# Patient Record
Sex: FEMALE | Race: WHITE | NOT HISPANIC OR LATINO | Employment: OTHER | URBAN - METROPOLITAN AREA
[De-identification: names, ages, dates, MRNs, and addresses within clinical notes are randomized per-mention and may not be internally consistent; named-entity substitution may affect disease eponyms.]

---

## 2015-03-23 LAB — HBA1C MFR BLD HPLC: 5.2 %

## 2015-09-29 LAB — HBA1C MFR BLD HPLC: 5.2 %

## 2016-04-05 LAB — HBA1C MFR BLD HPLC: 5.3 %

## 2016-10-10 LAB — HBA1C MFR BLD HPLC: 5.5 %

## 2017-04-19 LAB — HBA1C MFR BLD HPLC: 5 %

## 2017-10-24 LAB — HBA1C MFR BLD HPLC: 4.9 %

## 2018-06-30 LAB
ALBUMIN SERPL-MCNC: 4.1 G/DL (ref 3.6–5.1)
ALBUMIN/GLOB SERPL: 1.4 (CALC) (ref 1–2.5)
ALP SERPL-CCNC: 58 U/L (ref 33–130)
ALT SERPL-CCNC: 14 U/L (ref 6–29)
AST SERPL-CCNC: 15 U/L (ref 10–35)
BILIRUB SERPL-MCNC: 1 MG/DL (ref 0.2–1.2)
BUN SERPL-MCNC: 12 MG/DL (ref 7–25)
BUN/CREAT SERPL: ABNORMAL (CALC) (ref 6–22)
CALCIUM SERPL-MCNC: 9.2 MG/DL (ref 8.6–10.4)
CHLORIDE SERPL-SCNC: 110 MMOL/L (ref 98–110)
CHOLEST SERPL-MCNC: 157 MG/DL
CHOLEST/HDLC SERPL: 4.5 (CALC)
CO2 SERPL-SCNC: 25 MMOL/L (ref 20–31)
CREAT SERPL-MCNC: 0.79 MG/DL (ref 0.6–0.93)
FT4I SERPL CALC-MCNC: 2.8 (ref 1.4–3.8)
GLOBULIN SER CALC-MCNC: 2.9 G/DL (CALC) (ref 1.9–3.7)
GLUCOSE SERPL-MCNC: 101 MG/DL (ref 65–99)
HBA1C MFR BLD: 4.8 % OF TOTAL HGB
HDLC SERPL-MCNC: 35 MG/DL
LDLC SERPL CALC-MCNC: 89 MG/DL (CALC)
NONHDLC SERPL-MCNC: 122 MG/DL (CALC)
POTASSIUM SERPL-SCNC: 4.2 MMOL/L (ref 3.5–5.3)
PROT SERPL-MCNC: 7 G/DL (ref 6.1–8.1)
SL AMB EGFR AFRICAN AMERICAN: 85 ML/MIN/1.73M2
SL AMB EGFR NON AFRICAN AMERICAN: 74 ML/MIN/1.73M2
SODIUM SERPL-SCNC: 143 MMOL/L (ref 135–146)
T3RU NFR SERPL: 31 % (ref 22–35)
T4 SERPL-MCNC: 9.1 MCG/DL (ref 4.5–12)
TRIGL SERPL-MCNC: 248 MG/DL
TSH SERPL-ACNC: 0.81 MIU/L (ref 0.4–4.5)

## 2018-07-12 ENCOUNTER — OFFICE VISIT (OUTPATIENT)
Dept: ENDOCRINOLOGY | Facility: HOSPITAL | Age: 73
End: 2018-07-12
Payer: MEDICARE

## 2018-07-12 VITALS
DIASTOLIC BLOOD PRESSURE: 66 MMHG | SYSTOLIC BLOOD PRESSURE: 136 MMHG | HEIGHT: 66 IN | HEART RATE: 60 BPM | BODY MASS INDEX: 41.4 KG/M2 | WEIGHT: 257.6 LBS

## 2018-07-12 DIAGNOSIS — E74.89 OTHER SPECIFIED DISORDERS OF CARBOHYDRATE METABOLISM (HCC): ICD-10-CM

## 2018-07-12 DIAGNOSIS — E03.9 HYPOTHYROIDISM, UNSPECIFIED TYPE: Primary | ICD-10-CM

## 2018-07-12 DIAGNOSIS — E78.5 HYPERLIPIDEMIA, UNSPECIFIED HYPERLIPIDEMIA TYPE: ICD-10-CM

## 2018-07-12 DIAGNOSIS — I10 HYPERTENSION, UNSPECIFIED TYPE: ICD-10-CM

## 2018-07-12 DIAGNOSIS — Z87.898 HISTORY OF PREDIABETES: ICD-10-CM

## 2018-07-12 PROCEDURE — 99204 OFFICE O/P NEW MOD 45 MIN: CPT | Performed by: INTERNAL MEDICINE

## 2018-07-12 RX ORDER — APIXABAN 5 MG/1
5 TABLET, FILM COATED ORAL 2 TIMES DAILY
Refills: 11 | COMMUNITY
Start: 2018-06-22

## 2018-07-12 RX ORDER — LEVOTHYROXINE SODIUM 200 MCG
200 TABLET ORAL DAILY
COMMUNITY
Start: 2018-04-17 | End: 2018-07-12

## 2018-07-12 RX ORDER — NIFEDIPINE 60 MG/1
60 TABLET, EXTENDED RELEASE ORAL DAILY
Qty: 90 TABLET | Refills: 3 | Status: SHIPPED | OUTPATIENT
Start: 2018-07-12 | End: 2019-02-07 | Stop reason: SDUPTHER

## 2018-07-12 RX ORDER — LEVOTHYROXINE SODIUM 200 MCG
TABLET ORAL
Qty: 90 TABLET | Refills: 3 | Status: SHIPPED | OUTPATIENT
Start: 2018-07-12 | End: 2019-02-07 | Stop reason: SDUPTHER

## 2018-07-12 RX ORDER — NIFEDIPINE 60 MG/1
60 TABLET, EXTENDED RELEASE ORAL DAILY
COMMUNITY
Start: 2018-04-17 | End: 2018-07-12 | Stop reason: SDUPTHER

## 2018-07-12 RX ORDER — ATENOLOL 25 MG/1
25 TABLET ORAL 2 TIMES DAILY
COMMUNITY
Start: 2018-06-13 | End: 2018-07-12 | Stop reason: SDUPTHER

## 2018-07-12 RX ORDER — ATENOLOL 25 MG/1
25 TABLET ORAL 2 TIMES DAILY
Qty: 180 TABLET | Refills: 3 | Status: SHIPPED | OUTPATIENT
Start: 2018-07-12 | End: 2019-08-04 | Stop reason: SDUPTHER

## 2018-07-12 RX ORDER — MULTIVIT WITH MINERALS/LUTEIN
1000 TABLET ORAL
COMMUNITY

## 2018-07-12 NOTE — LETTER
July 12, 2018     Mary Bell MD  [address] [place] [address]    Patient: Joycelyn Nguyen   YOB: 1945   Date of Visit: 7/12/2018       Dear Dr Bibiana Lopez: Thank you for referring Alyssa Red to me for evaluation  Below are my notes for this consultation  If you have questions, please do not hesitate to call me  I look forward to following your patient along with you  Sincerely,        Clayton Alicia DO        CC: No Recipients  Clayton Alicia DO  7/12/2018 12:10 PM  Sign at close encounter  7/12/2018    Assessment/Plan      Diagnoses and all orders for this visit:    Hypothyroidism, unspecified type  -     SYNTHROID 200 MCG tablet; 1 tab daily BRAND NECESSARY  -     TSH, 3rd generation Lab Collect; Future  -     T4, free Lab Collect; Future    Hypertension, unspecified type  -     atenolol (TENORMIN) 25 mg tablet; Take 1 tablet (25 mg total) by mouth 2 (two) times a day  -     NIFEdipine (PROCARDIA XL) 60 mg 24 hr tablet; Take 1 tablet (60 mg total) by mouth daily    History of prediabetes  -     Comprehensive metabolic panel Lab Collect; Future  -     HEMOGLOBIN A1C W/ EAG ESTIMATION Lab Collect; Future    Hyperlipidemia, unspecified hyperlipidemia type  -     Comprehensive metabolic panel Lab Collect; Future  -     Lipid Panel with Direct LDL reflex Lab Collect; Future    Other specified disorders of carbohydrate metabolism ([facility] )   -     HEMOGLOBIN A1C W/ EAG ESTIMATION Lab Collect; Future    Other orders  -     ELIQUIS 5 MG; Take 5 mg by mouth 2 (two) times a day  -     Ascorbic Acid (VITAMIN C) 1000 MG tablet; Take 1,000 mg by mouth  -     aspirin 81 MG tablet; Take 81 mg by mouth  -     Discontinue: atenolol (TENORMIN) 25 mg tablet; Take 25 mg by mouth 2 (two) times a day  -     Discontinue: SYNTHROID 200 MCG tablet; Take 200 mcg by mouth daily  -     Multiple Vitamins-Minerals (MULTIVITAMIN ADULT PO);  Take 1 tablet by mouth  -     Discontinue: NIFEdipine (PROCARDIA XL) 60 mg 24 hr tablet; Take 60 mg by mouth daily  -     sertraline (ZOLOFT) 50 mg tablet; Take 50 mg by mouth daily  -     Misc Natural Products (TURMERIC CURCUMIN) CAPS; Take by mouth        Assessment/Plan:  1  Hypothyroidism:  Clinically and biochemically euthyroid on Synthroid brand 200 mcg daily  Continue this dose and check TSH and free T4 before next appointment  2  Hypertension:  Controlled on current regimen  CC:   Hypothyroidism    History of Present Illness     HPI: Tita Lo is a 74y o  year old female with history of hypothyroidism, anxiety, pulmonary embolism, hypertension who presents to [facility] care  Previous patient doctor Javed  She takes Synthroid brand 100 mcg daily for her hypothyroidism  Overall she feels well on this  Lately, she has had increased stress in her life due to being active in general as well as her  suffered a stroke in April and was treated at [facility]   He is doing better  She recently had her Zoloft dose increase to 50 mg daily in overall she think she is doing better  She denies any symptoms of hyperthyroidism  Review of Systems   Constitutional: Negative for chills, fatigue and fever  HENT: Negative for trouble swallowing and voice change  Eyes: Negative for visual disturbance  Respiratory: Negative for shortness of breath  Cardiovascular: Negative for chest pain, palpitations and leg swelling  Gastrointestinal: Negative for abdominal pain, nausea and vomiting  Endocrine: Negative for polydipsia and polyuria  Musculoskeletal: Negative for arthralgias and myalgias  Skin: Negative for rash  Neurological: Negative for dizziness, tremors and weakness  Hematological: Negative for adenopathy  Psychiatric/Behavioral: Negative for agitation and confusion  The patient is nervous/anxious (more stress in life lately)  Historical Information   History reviewed   No pertinent past medical history  History reviewed  No pertinent surgical history  Social History   History   Alcohol use Not on file     History   Drug use: Unknown     History   Smoking Status    Never Smoker   Smokeless Tobacco    Never Used     Family History:   Family History   Problem Relation Age of Onset    Colon cancer Father     Diabetes unspecified Sister        Meds/Allergies   Current Outpatient Prescriptions   Medication Sig Dispense Refill    Ascorbic Acid (VITAMIN C) 1000 MG tablet Take 1,000 mg by mouth      aspirin 81 MG tablet Take 81 mg by mouth      atenolol (TENORMIN) 25 mg tablet Take 1 tablet (25 mg total) by mouth 2 (two) times a day 180 tablet 3    ELIQUIS 5 MG Take 5 mg by mouth 2 (two) times a day  11    Misc Natural Products (TURMERIC CURCUMIN) CAPS Take by mouth      Multiple Vitamins-Minerals (MULTIVITAMIN ADULT PO) Take 1 tablet by mouth      NIFEdipine (PROCARDIA XL) 60 mg 24 hr tablet Take 1 tablet (60 mg total) by mouth daily 90 tablet 3    sertraline (ZOLOFT) 50 mg tablet Take 50 mg by mouth daily      SYNTHROID 200 MCG tablet 1 tab daily BRAND NECESSARY  90 tablet 3     No current facility-administered medications for this visit  No Known Allergies    Objective   Vitals: Blood pressure 136/66, pulse 60, height 5' 5 5" (1 664 m), weight 117 kg (257 lb 9 6 oz)  Invasive Devices          No matching active lines, drains, or airways          Physical Exam   Constitutional: She is oriented to person, place, and time  She appears well-developed and well-nourished  No distress  HENT:   Head: Normocephalic and atraumatic  Mouth/Throat: No oropharyngeal exudate  Eyes: Conjunctivae and EOM are normal  Pupils are equal, round, and reactive to light  No scleral icterus  Neck: Normal range of motion  Neck supple  No thyromegaly present  Cardiovascular: Normal rate and regular rhythm  No murmur heard  Pulmonary/Chest: Effort normal and breath sounds normal  She has no wheezes  Abdominal: Soft  Bowel sounds are normal  She exhibits no distension  There is no tenderness  Musculoskeletal: Normal range of motion  She exhibits no edema  Neurological: She is alert and oriented to person, place, and time  She exhibits normal muscle tone  Skin: Skin is warm and dry  No rash noted  She is not diaphoretic  Psychiatric: She has a normal mood and affect  Her behavior is normal        The history was obtained from the review of the chart and from the patient      Lab Results:      Recent Results (from the past 62849 hour(s))   Lipid panel    Collection Time: 06/29/18  9:35 AM   Result Value Ref Range    Total Cholesterol 157 <200 mg/dL    HDL 35 (L) >50 mg/dL    Triglycerides 248 (H) <150 mg/dL    LDL Direct 89 mg/dL (calc)    Chol HDLC Ratio 4 5 <5 0 (calc)    Non-HDL Cholesterol 122 <130 mg/dL (calc)   Comprehensive metabolic panel    Collection Time: 06/29/18  9:35 AM   Result Value Ref Range    SL AMB GLUCOSE 101 (H) 65 - 99 mg/dL    BUN 12 7 - 25 mg/dL    Creatinine, Serum 0 79 0 60 - 0 93 mg/dL    eGFR Non  74 > OR = 60 mL/min/1 73m2    SL AMB EGFR  85 > OR = 60 mL/min/1 73m2    SL AMB BUN/CREATININE RATIO NOT APPLICABLE 6 - 22 (calc)    SL AMB SODIUM 143 135 - 146 mmol/L    SL AMB POTASSIUM 4 2 3 5 - 5 3 mmol/L    SL AMB CHLORIDE 110 98 - 110 mmol/L    SL AMB CARBON DIOXIDE 25 20 - 31 mmol/L    SL AMB CALCIUM 9 2 8 6 - 10 4 mg/dL    SL AMB PROTEIN, TOTAL 7 0 6 1 - 8 1 g/dL    Serum Albumin 4 1 3 6 - 5 1 g/dL    SL AMB GLOBULIN 2 9 1 9 - 3 7 g/dL (calc)    SL AMB ALBUMIN/GLOBULIN RATIO 1 4 1 0 - 2 5 (calc)    SL AMB BILIRUBIN, TOTAL 1 0 0 2 - 1 2 mg/dL    SL AMB ALKALINE PHOSPHATASE 58 33 - 130 U/L    SL AMB AST 15 10 - 35 U/L    SL AMB ALT 14 6 - 29 U/L   Thyroid Panel With TSH    Collection Time: 06/29/18  9:35 AM   Result Value Ref Range    T3 Uptake Ratio 31 22 - 35 %    T4, Total 9 1 4 5 - 12 0 mcg/dL    Free T4 Index (T7) 2 8 1 4 - 3 8    TSH 0 81 0 40 - 4 50 mIU/L   Hemoglobin A1c (w/out EAG)    Collection Time: 06/29/18  9:35 AM   Result Value Ref Range    Hemoglobin A1C 4 8 <5 7 % of total Hgb         No future appointments

## 2018-07-12 NOTE — PROGRESS NOTES
7/12/2018    Assessment/Plan      Diagnoses and all orders for this visit:    Hypothyroidism, unspecified type  -     SYNTHROID 200 MCG tablet; 1 tab daily BRAND NECESSARY  -     TSH, 3rd generation Lab Collect; Future  -     T4, free Lab Collect; Future    Hypertension, unspecified type  -     atenolol (TENORMIN) 25 mg tablet; Take 1 tablet (25 mg total) by mouth 2 (two) times a day  -     NIFEdipine (PROCARDIA XL) 60 mg 24 hr tablet; Take 1 tablet (60 mg total) by mouth daily    History of prediabetes  -     Comprehensive metabolic panel Lab Collect; Future  -     HEMOGLOBIN A1C W/ EAG ESTIMATION Lab Collect; Future    Hyperlipidemia, unspecified hyperlipidemia type  -     Comprehensive metabolic panel Lab Collect; Future  -     Lipid Panel with Direct LDL reflex Lab Collect; Future    Other specified disorders of carbohydrate metabolism ([facility] )   -     HEMOGLOBIN A1C W/ EAG ESTIMATION Lab Collect; Future    Other orders  -     ELIQUIS 5 MG; Take 5 mg by mouth 2 (two) times a day  -     Ascorbic Acid (VITAMIN C) 1000 MG tablet; Take 1,000 mg by mouth  -     aspirin 81 MG tablet; Take 81 mg by mouth  -     Discontinue: atenolol (TENORMIN) 25 mg tablet; Take 25 mg by mouth 2 (two) times a day  -     Discontinue: SYNTHROID 200 MCG tablet; Take 200 mcg by mouth daily  -     Multiple Vitamins-Minerals (MULTIVITAMIN ADULT PO); Take 1 tablet by mouth  -     Discontinue: NIFEdipine (PROCARDIA XL) 60 mg 24 hr tablet; Take 60 mg by mouth daily  -     sertraline (ZOLOFT) 50 mg tablet; Take 50 mg by mouth daily  -     Misc Natural Products (TURMERIC CURCUMIN) CAPS; Take by mouth        Assessment/Plan:  1  Hypothyroidism:  Clinically and biochemically euthyroid on Synthroid brand 200 mcg daily  Continue this dose and check TSH and free T4 before next appointment  2  Hypertension:  Controlled on current regimen        CC:   Hypothyroidism    History of Present Illness     HPI: Michelle Smalls is a 74y o  year old female with history of hypothyroidism, anxiety, pulmonary embolism, hypertension who presents to establish care  Previous patient doctor Tegan  She takes Synthroid brand 100 mcg daily for her hypothyroidism  Overall she feels well on this  Lately, she has had increased stress in her life due to being active in general as well as her  suffered a stroke in April and was treated at [facility]   He is doing better  She recently had her Zoloft dose increase to 50 mg daily in overall she think she is doing better  She denies any symptoms of hyperthyroidism  Review of Systems   Constitutional: Negative for chills, fatigue and fever  HENT: Negative for trouble swallowing and voice change  Eyes: Negative for visual disturbance  Respiratory: Negative for shortness of breath  Cardiovascular: Negative for chest pain, palpitations and leg swelling  Gastrointestinal: Negative for abdominal pain, nausea and vomiting  Endocrine: Negative for polydipsia and polyuria  Musculoskeletal: Negative for arthralgias and myalgias  Skin: Negative for rash  Neurological: Negative for dizziness, tremors and weakness  Hematological: Negative for adenopathy  Psychiatric/Behavioral: Negative for agitation and confusion  The patient is nervous/anxious (more stress in life lately)  Historical Information   History reviewed  No pertinent past medical history  History reviewed  No pertinent surgical history    Social History   History   Alcohol use Not on file     History   Drug use: Unknown     History   Smoking Status    Never Smoker   Smokeless Tobacco    Never Used     Family History:   Family History   Problem Relation Age of Onset    Colon cancer Father     Diabetes unspecified Sister        Meds/Allergies   Current Outpatient Prescriptions   Medication Sig Dispense Refill    Ascorbic Acid (VITAMIN C) 1000 MG tablet Take 1,000 mg by mouth      aspirin 81 MG tablet Take 81 mg by mouth      atenolol (TENORMIN) 25 mg tablet Take 1 tablet (25 mg total) by mouth 2 (two) times a day 180 tablet 3    ELIQUIS 5 MG Take 5 mg by mouth 2 (two) times a day  11    Misc Natural Products (TURMERIC CURCUMIN) CAPS Take by mouth      Multiple Vitamins-Minerals (MULTIVITAMIN ADULT PO) Take 1 tablet by mouth      NIFEdipine (PROCARDIA XL) 60 mg 24 hr tablet Take 1 tablet (60 mg total) by mouth daily 90 tablet 3    sertraline (ZOLOFT) 50 mg tablet Take 50 mg by mouth daily      SYNTHROID 200 MCG tablet 1 tab daily BRAND NECESSARY  90 tablet 3     No current facility-administered medications for this visit  No Known Allergies    Objective   Vitals: Blood pressure 136/66, pulse 60, height 5' 5 5" (1 664 m), weight 117 kg (257 lb 9 6 oz)  Invasive Devices          No matching active lines, drains, or airways          Physical Exam   Constitutional: She is oriented to person, place, and time  She appears well-developed and well-nourished  No distress  HENT:   Head: Normocephalic and atraumatic  Mouth/Throat: No oropharyngeal exudate  Eyes: Conjunctivae and EOM are normal  Pupils are equal, round, and reactive to light  No scleral icterus  Neck: Normal range of motion  Neck supple  No thyromegaly present  Cardiovascular: Normal rate and regular rhythm  No murmur heard  Pulmonary/Chest: Effort normal and breath sounds normal  She has no wheezes  Abdominal: Soft  Bowel sounds are normal  She exhibits no distension  There is no tenderness  Musculoskeletal: Normal range of motion  She exhibits no edema  Neurological: She is alert and oriented to person, place, and time  She exhibits normal muscle tone  Skin: Skin is warm and dry  No rash noted  She is not diaphoretic  Psychiatric: She has a normal mood and affect  Her behavior is normal        The history was obtained from the review of the chart and from the patient      Lab Results:      Recent Results (from the past 88244 hour(s))   Lipid panel    Collection Time: 06/29/18  9:35 AM   Result Value Ref Range    Total Cholesterol 157 <200 mg/dL    HDL 35 (L) >50 mg/dL    Triglycerides 248 (H) <150 mg/dL    LDL Direct 89 mg/dL (calc)    Chol HDLC Ratio 4 5 <5 0 (calc)    Non-HDL Cholesterol 122 <130 mg/dL (calc)   Comprehensive metabolic panel    Collection Time: 06/29/18  9:35 AM   Result Value Ref Range    SL AMB GLUCOSE 101 (H) 65 - 99 mg/dL    BUN 12 7 - 25 mg/dL    Creatinine, Serum 0 79 0 60 - 0 93 mg/dL    eGFR Non  74 > OR = 60 mL/min/1 73m2    SL AMB EGFR  85 > OR = 60 mL/min/1 73m2    SL AMB BUN/CREATININE RATIO NOT APPLICABLE 6 - 22 (calc)    SL AMB SODIUM 143 135 - 146 mmol/L    SL AMB POTASSIUM 4 2 3 5 - 5 3 mmol/L    SL AMB CHLORIDE 110 98 - 110 mmol/L    SL AMB CARBON DIOXIDE 25 20 - 31 mmol/L    SL AMB CALCIUM 9 2 8 6 - 10 4 mg/dL    SL AMB PROTEIN, TOTAL 7 0 6 1 - 8 1 g/dL    Serum Albumin 4 1 3 6 - 5 1 g/dL    SL AMB GLOBULIN 2 9 1 9 - 3 7 g/dL (calc)    SL AMB ALBUMIN/GLOBULIN RATIO 1 4 1 0 - 2 5 (calc)    SL AMB BILIRUBIN, TOTAL 1 0 0 2 - 1 2 mg/dL    SL AMB ALKALINE PHOSPHATASE 58 33 - 130 U/L    SL AMB AST 15 10 - 35 U/L    SL AMB ALT 14 6 - 29 U/L   Thyroid Panel With TSH    Collection Time: 06/29/18  9:35 AM   Result Value Ref Range    T3 Uptake Ratio 31 22 - 35 %    T4, Total 9 1 4 5 - 12 0 mcg/dL    Free T4 Index (T7) 2 8 1 4 - 3 8    TSH 0 81 0 40 - 4 50 mIU/L   Hemoglobin A1c (w/out EAG)    Collection Time: 06/29/18  9:35 AM   Result Value Ref Range    Hemoglobin A1C 4 8 <5 7 % of total Hgb         No future appointments

## 2019-01-29 LAB
ALBUMIN SERPL-MCNC: 4 G/DL (ref 3.6–5.1)
ALBUMIN/GLOB SERPL: 1.5 (CALC) (ref 1–2.5)
ALP SERPL-CCNC: 53 U/L (ref 33–130)
ALT SERPL-CCNC: 23 U/L (ref 6–29)
AST SERPL-CCNC: 24 U/L (ref 10–35)
BILIRUB SERPL-MCNC: 1.2 MG/DL (ref 0.2–1.2)
BUN SERPL-MCNC: 10 MG/DL (ref 7–25)
BUN/CREAT SERPL: ABNORMAL (CALC) (ref 6–22)
CALCIUM SERPL-MCNC: 9.2 MG/DL (ref 8.6–10.4)
CHLORIDE SERPL-SCNC: 109 MMOL/L (ref 98–110)
CHOLEST SERPL-MCNC: 161 MG/DL
CHOLEST/HDLC SERPL: 4.2 (CALC)
CO2 SERPL-SCNC: 25 MMOL/L (ref 20–32)
CREAT SERPL-MCNC: 0.79 MG/DL (ref 0.6–0.93)
EST. AVERAGE GLUCOSE BLD GHB EST-MCNC: 94 (CALC)
EST. AVERAGE GLUCOSE BLD GHB EST-SCNC: 5.2 (CALC)
GLOBULIN SER CALC-MCNC: 2.7 G/DL (CALC) (ref 1.9–3.7)
GLUCOSE SERPL-MCNC: 100 MG/DL (ref 65–99)
HBA1C MFR BLD: 4.9 % OF TOTAL HGB
HDLC SERPL-MCNC: 38 MG/DL
LDLC SERPL CALC-MCNC: 92 MG/DL (CALC)
NONHDLC SERPL-MCNC: 123 MG/DL (CALC)
POTASSIUM SERPL-SCNC: 4.2 MMOL/L (ref 3.5–5.3)
PROT SERPL-MCNC: 6.7 G/DL (ref 6.1–8.1)
SL AMB EGFR AFRICAN AMERICAN: 85 ML/MIN/1.73M2
SL AMB EGFR NON AFRICAN AMERICAN: 73 ML/MIN/1.73M2
SODIUM SERPL-SCNC: 144 MMOL/L (ref 135–146)
T4 FREE SERPL-MCNC: 1.2 NG/DL (ref 0.8–1.8)
TRIGL SERPL-MCNC: 217 MG/DL
TSH SERPL-ACNC: 1.99 MIU/L (ref 0.4–4.5)

## 2019-02-07 ENCOUNTER — OFFICE VISIT (OUTPATIENT)
Dept: ENDOCRINOLOGY | Facility: HOSPITAL | Age: 74
End: 2019-02-07
Payer: COMMERCIAL

## 2019-02-07 VITALS
DIASTOLIC BLOOD PRESSURE: 64 MMHG | SYSTOLIC BLOOD PRESSURE: 122 MMHG | WEIGHT: 257 LBS | BODY MASS INDEX: 41.3 KG/M2 | HEART RATE: 64 BPM | HEIGHT: 66 IN

## 2019-02-07 DIAGNOSIS — E78.5 HYPERLIPIDEMIA, UNSPECIFIED HYPERLIPIDEMIA TYPE: ICD-10-CM

## 2019-02-07 DIAGNOSIS — I10 HYPERTENSION, UNSPECIFIED TYPE: ICD-10-CM

## 2019-02-07 DIAGNOSIS — E03.9 HYPOTHYROIDISM, UNSPECIFIED TYPE: Primary | ICD-10-CM

## 2019-02-07 PROCEDURE — 99214 OFFICE O/P EST MOD 30 MIN: CPT | Performed by: INTERNAL MEDICINE

## 2019-02-07 RX ORDER — NIFEDIPINE 60 MG/1
60 TABLET, EXTENDED RELEASE ORAL DAILY
Qty: 90 TABLET | Refills: 3 | Status: SHIPPED | OUTPATIENT
Start: 2019-02-07 | End: 2020-02-28

## 2019-02-07 RX ORDER — LEVOTHYROXINE SODIUM 200 MCG
TABLET ORAL
Qty: 90 TABLET | Refills: 3 | Status: SHIPPED | OUTPATIENT
Start: 2019-02-07 | End: 2020-02-12

## 2019-02-07 NOTE — PROGRESS NOTES
2/7/2019    Assessment/Plan      Diagnoses and all orders for this visit:    Hypothyroidism, unspecified type  -     SYNTHROID 200 MCG tablet; 1 tab daily BRAND NECESSARY  -     TSH, 3rd generation Lab Collect; Future  -     T4, free Lab Collect; Future    Hypertension, unspecified type  -     NIFEdipine (PROCARDIA XL) 60 mg 24 hr tablet; Take 1 tablet (60 mg total) by mouth daily  -     Comprehensive metabolic panel Lab Collect; Future    Hyperlipidemia, unspecified hyperlipidemia type  -     Lipid Panel with Direct LDL reflex Lab Collect; Future        Assessment/Plan:  1  Hypothyroidism:  Clinically and biochemically euthyroid on Synthroid brand 200 mcg daily  We will plan to see her back in 6 months with a TSH and free T4 just prior  2  Hypertension:  Controlled in the office today  Check CMP before next appointment  3  Hyperlipidemia:  Triglycerides are slightly high  Discussed dietary component of this  Check lipids before next appointment  CC:  Hypothyroidism follow-up    History of Present Illness     HPI: Aziza Germain is a 74y o  year old female with history of hypothyroidism, anxiety, pulmonary embolism, hypertension who presents for follow-up appointment  She currently takes Synthroid brand 200 mcg daily for history of hypothyroidism  Overall she feels well  She is having increased stress in her life in care of her  who had a stroke in the past year  Overall she feels well and has a good support system but certainly has a lot of stress in this regard taking care of her   She saw her PCP recently increased her sertraline  She also has a cardiologist who follows her cholesterol and blood pressure as well  Review of Systems   Constitutional: Negative for fatigue  HENT: Negative for trouble swallowing and voice change  Eyes: Negative for visual disturbance  Respiratory: Negative for shortness of breath      Cardiovascular: Negative for palpitations and leg swelling  Gastrointestinal: Negative for abdominal pain, nausea and vomiting  Endocrine: Negative for cold intolerance, heat intolerance, polydipsia and polyuria  Musculoskeletal: Negative for arthralgias and myalgias  Skin: Negative for rash  Neurological: Negative for dizziness, tremors and weakness  Hematological: Negative for adenopathy  Psychiatric/Behavioral: Negative for agitation and confusion  Historical Information   History reviewed  No pertinent past medical history  History reviewed  No pertinent surgical history  Social History   History   Alcohol use Not on file     History   Drug use: Unknown     History   Smoking Status    Never Smoker   Smokeless Tobacco    Never Used     Family History:   Family History   Problem Relation Age of Onset    Colon cancer Father     Diabetes unspecified Sister        Meds/Allergies   Current Outpatient Prescriptions   Medication Sig Dispense Refill    Ascorbic Acid (VITAMIN C) 1000 MG tablet Take 1,000 mg by mouth      aspirin 81 MG tablet Take 81 mg by mouth      atenolol (TENORMIN) 25 mg tablet Take 1 tablet (25 mg total) by mouth 2 (two) times a day 180 tablet 3    ELIQUIS 5 MG Take 5 mg by mouth 2 (two) times a day  11    Misc Natural Products (TURMERIC CURCUMIN) CAPS Take by mouth      Multiple Vitamins-Minerals (MULTIVITAMIN ADULT PO) Take 1 tablet by mouth      NIFEdipine (PROCARDIA XL) 60 mg 24 hr tablet Take 1 tablet (60 mg total) by mouth daily 90 tablet 3    sertraline (ZOLOFT) 50 mg tablet Take 100 mg by mouth daily        SYNTHROID 200 MCG tablet 1 tab daily BRAND NECESSARY  90 tablet 3     No current facility-administered medications for this visit  No Known Allergies    Objective   Vitals: Blood pressure 122/64, pulse 64, height 5' 5 5" (1 664 m), weight 117 kg (257 lb)    Invasive Devices          No matching active lines, drains, or airways          Physical Exam   Constitutional: She is oriented to person, place, and time  She appears well-developed and well-nourished  No distress  HENT:   Head: Normocephalic and atraumatic  Eyes: Pupils are equal, round, and reactive to light  Conjunctivae are normal    Neck: Normal range of motion  Neck supple  No thyromegaly present  Cardiovascular: Normal rate and regular rhythm  Pulmonary/Chest: Effort normal and breath sounds normal  No respiratory distress  Abdominal: Soft  Bowel sounds are normal  She exhibits no distension  Musculoskeletal: Normal range of motion  She exhibits no edema  Neurological: She is alert and oriented to person, place, and time  She exhibits normal muscle tone  Skin: Skin is warm and dry  No rash noted  She is not diaphoretic  Psychiatric: She has a normal mood and affect  Her behavior is normal    Vitals reviewed  The history was obtained from the review of the chart and from the patient      Lab Results:      Recent Results (from the past 65087 hour(s))   Lipid panel    Collection Time: 06/29/18  9:35 AM   Result Value Ref Range    Total Cholesterol 157 <200 mg/dL    HDL 35 (L) >50 mg/dL    Triglycerides 248 (H) <150 mg/dL    LDL Direct 89 mg/dL (calc)    Chol HDLC Ratio 4 5 <5 0 (calc)    Non-HDL Cholesterol 122 <130 mg/dL (calc)   Comprehensive metabolic panel    Collection Time: 06/29/18  9:35 AM   Result Value Ref Range    Glucose, Random 101 (H) 65 - 99 mg/dL    BUN 12 7 - 25 mg/dL    Creatinine 0 79 0 60 - 0 93 mg/dL    eGFR Non  74 > OR = 60 mL/min/1 73m2    eGFR  85 > OR = 60 mL/min/1 73m2    SL AMB BUN/CREATININE RATIO NOT APPLICABLE 6 - 22 (calc)    Sodium 143 135 - 146 mmol/L    Potassium 4 2 3 5 - 5 3 mmol/L    Chloride 110 98 - 110 mmol/L    CO2 25 20 - 31 mmol/L    SL AMB CALCIUM 9 2 8 6 - 10 4 mg/dL    Protein, Total 7 0 6 1 - 8 1 g/dL    Albumin 4 1 3 6 - 5 1 g/dL    Globulin 2 9 1 9 - 3 7 g/dL (calc)    Albumin/Globulin Ratio 1 4 1 0 - 2 5 (calc)    TOTAL BILIRUBIN 1 0 0 2 - 1 2 mg/dL Alkaline Phosphatase 58 33 - 130 U/L    AST 15 10 - 35 U/L    ALT 14 6 - 29 U/L   Thyroid Panel With TSH    Collection Time: 06/29/18  9:35 AM   Result Value Ref Range    T3 Uptake Ratio 31 22 - 35 %    T4, Total 9 1 4 5 - 12 0 mcg/dL    Free T4 Index (T7) 2 8 1 4 - 3 8    TSH 0 81 0 40 - 4 50 mIU/L   Hemoglobin A1c (w/out EAG)    Collection Time: 06/29/18  9:35 AM   Result Value Ref Range    Hemoglobin A1C 4 8 <5 7 % of total Hgb   Lipid Panel with Direct LDL reflex    Collection Time: 01/28/19  9:58 AM   Result Value Ref Range    Total Cholesterol 161 <200 mg/dL    HDL 38 (L) >50 mg/dL    Triglycerides 217 (H) <150 mg/dL    LDL Direct 92 mg/dL (calc)    Chol HDLC Ratio 4 2 <5 0 (calc)    Non-HDL Cholesterol 123 <130 mg/dL (calc)   Comprehensive metabolic panel    Collection Time: 01/28/19  9:58 AM   Result Value Ref Range    Glucose, Random 100 (H) 65 - 99 mg/dL    BUN 10 7 - 25 mg/dL    Creatinine 0 79 0 60 - 0 93 mg/dL    eGFR Non  73 > OR = 60 mL/min/1 73m2    eGFR  85 > OR = 60 mL/min/1 73m2    SL AMB BUN/CREATININE RATIO NOT APPLICABLE 6 - 22 (calc)    Sodium 144 135 - 146 mmol/L    Potassium 4 2 3 5 - 5 3 mmol/L    Chloride 109 98 - 110 mmol/L    CO2 25 20 - 32 mmol/L    SL AMB CALCIUM 9 2 8 6 - 10 4 mg/dL    Protein, Total 6 7 6 1 - 8 1 g/dL    Albumin 4 0 3 6 - 5 1 g/dL    Globulin 2 7 1 9 - 3 7 g/dL (calc)    Albumin/Globulin Ratio 1 5 1 0 - 2 5 (calc)    TOTAL BILIRUBIN 1 2 0 2 - 1 2 mg/dL    Alkaline Phosphatase 53 33 - 130 U/L    AST 24 10 - 35 U/L    ALT 23 6 - 29 U/L   TSH+Free T4    Collection Time: 01/28/19  9:58 AM   Result Value Ref Range    TSH 1 99 0 40 - 4 50 mIU/L    Free t4 1 2 0 8 - 1 8 ng/dL   Hemoglobin A1C With EAG    Collection Time: 01/28/19  9:58 AM   Result Value Ref Range    Hemoglobin A1C 4 9 <5 7 % of total Hgb    Estimated Average Glucose 94 (calc)    Estimated Average Glucose (mmol/L) 5 2 (calc)         No future appointments      Portions of the record may have been created with voice recognition software  Occasional wrong word or "sound a like" substitutions may have occurred due to the inherent limitations of voice recognition software  Read the chart carefully and recognize, using context, where substitutions have occurred

## 2019-08-01 LAB — HBA1C MFR BLD HPLC: 4.8 %

## 2019-08-04 DIAGNOSIS — I10 HYPERTENSION, UNSPECIFIED TYPE: ICD-10-CM

## 2019-08-05 RX ORDER — ATENOLOL 25 MG/1
TABLET ORAL
Qty: 180 TABLET | Refills: 3 | Status: SHIPPED | OUTPATIENT
Start: 2019-08-05

## 2019-08-21 ENCOUNTER — OFFICE VISIT (OUTPATIENT)
Dept: ENDOCRINOLOGY | Facility: HOSPITAL | Age: 74
End: 2019-08-21
Payer: COMMERCIAL

## 2019-08-21 VITALS
WEIGHT: 253 LBS | HEART RATE: 56 BPM | DIASTOLIC BLOOD PRESSURE: 60 MMHG | HEIGHT: 66 IN | SYSTOLIC BLOOD PRESSURE: 120 MMHG | BODY MASS INDEX: 40.66 KG/M2

## 2019-08-21 DIAGNOSIS — I10 ESSENTIAL HYPERTENSION: ICD-10-CM

## 2019-08-21 DIAGNOSIS — E03.9 HYPOTHYROIDISM, UNSPECIFIED TYPE: Primary | ICD-10-CM

## 2019-08-21 PROCEDURE — 3074F SYST BP LT 130 MM HG: CPT | Performed by: INTERNAL MEDICINE

## 2019-08-21 PROCEDURE — 99214 OFFICE O/P EST MOD 30 MIN: CPT | Performed by: INTERNAL MEDICINE

## 2019-08-21 NOTE — PROGRESS NOTES
8/21/2019    Assessment/Plan      Diagnoses and all orders for this visit:    Hypothyroidism, unspecified type  -     TSH, 3rd generation Lab Collect; Future  -     T4, free Lab Collect; Future    Essential hypertension  -     Comprehensive metabolic panel Lab Collect; Future  -     HEMOGLOBIN A1C W/ EAG ESTIMATION Lab Collect; Future  -     Lipid Panel with Direct LDL reflex Lab Collect; Future  -     CBC and differential- Lab Collect; Future        Assessment/Plan:  1  Hypothyroidism:  Clinically and biochemically doing well on current regimen  Follow-up in 1 year with labs as ordered above just prior  She can call sooner with any questions or concerns  2  Hypertension:  Normotensive in the office today on current regimen  Check labs as ordered above prior to next appointment which will be in 1 year  CC:  Follow-up    History of Present Illness     HPI: Eunice Murcia is a 74y o  year old female with history of hypothyroidism, anxiety, pulmonary embolism, hypertension presents for follow-up appointment  She is currently maintained on Synthroid brand specific 200 mcg daily for history of hypothyroidism  She presents today overall feeling well  No new health issues or symptoms of concern  She reports less stress in her life lately it was visiting her  3 days a week who is at a memory care facility at this time  She will be seeing her cardiologist in December  Review of Systems   Constitutional: Negative for fatigue  HENT: Negative for trouble swallowing and voice change  Eyes: Negative for visual disturbance  Respiratory: Negative for shortness of breath  Cardiovascular: Negative for palpitations and leg swelling  Gastrointestinal: Negative for abdominal pain, nausea and vomiting  Endocrine: Negative for polydipsia and polyuria  Musculoskeletal: Negative for arthralgias and myalgias  Skin: Negative for rash  Neurological: Negative for dizziness, tremors and weakness  Hematological: Negative for adenopathy  Psychiatric/Behavioral: Negative for agitation and confusion  Historical Information   History reviewed  No pertinent past medical history  History reviewed  No pertinent surgical history  Social History   Social History     Substance and Sexual Activity   Alcohol Use Not on file     Social History     Substance and Sexual Activity   Drug Use Not on file     Social History     Tobacco Use   Smoking Status Never Smoker   Smokeless Tobacco Never Used     Family History:   Family History   Problem Relation Age of Onset    Colon cancer Father     Diabetes unspecified Sister        Meds/Allergies   Current Outpatient Medications   Medication Sig Dispense Refill    Ascorbic Acid (VITAMIN C) 1000 MG tablet Take 1,000 mg by mouth      aspirin 81 MG tablet Take 81 mg by mouth      atenolol (TENORMIN) 25 mg tablet TAKE 1 TABLET TWICE A  tablet 3    ELIQUIS 5 MG Take 5 mg by mouth 2 (two) times a day  11    Misc Natural Products (TURMERIC CURCUMIN) CAPS Take by mouth      NIFEdipine (PROCARDIA XL) 60 mg 24 hr tablet Take 1 tablet (60 mg total) by mouth daily 90 tablet 3    sertraline (ZOLOFT) 50 mg tablet Take 100 mg by mouth daily        SYNTHROID 200 MCG tablet 1 tab daily BRAND NECESSARY  90 tablet 3    Multiple Vitamins-Minerals (MULTIVITAMIN ADULT PO) Take 1 tablet by mouth       No current facility-administered medications for this visit  No Known Allergies    Objective   Vitals: Blood pressure 120/60, pulse 56, height 5' 5 5" (1 664 m), weight 115 kg (253 lb)  Invasive Devices     None                 Physical Exam   Constitutional: She is oriented to person, place, and time  She appears well-developed and well-nourished  No distress  HENT:   Head: Normocephalic and atraumatic  Neck: Normal range of motion  Neck supple  No thyromegaly present  Cardiovascular: Normal rate and regular rhythm     Pulmonary/Chest: Effort normal and breath sounds normal  No respiratory distress  Abdominal: Soft  Bowel sounds are normal    Musculoskeletal: Normal range of motion  She exhibits no edema  Neurological: She is alert and oriented to person, place, and time  She exhibits normal muscle tone  Skin: Skin is warm and dry  No rash noted  She is not diaphoretic  Psychiatric: She has a normal mood and affect  Her behavior is normal    Vitals reviewed  The history was obtained from the review of the chart and from the patient  Lab Results:    Labs from [address] [place] [address] on 08/01/2019:  TSH 1 33, free thyroxine index 3 1, total cholesterol 148, HDL 35, triglycerides 129, LDL 90, glucose 99, BUN 12, creatinine 0 8, electrolytes within normal limits, bilirubin 1 5, AST 27, ALT 21, alk phos 50, A1c 4 8, hemoglobin 13 9  No future appointments  Portions of the record may have been created with voice recognition software  Occasional wrong word or "sound a like" substitutions may have occurred due to the inherent limitations of voice recognition software  Read the chart carefully and recognize, using context, where substitutions have occurred

## 2020-02-09 DIAGNOSIS — E03.9 HYPOTHYROIDISM, UNSPECIFIED TYPE: ICD-10-CM

## 2020-02-12 RX ORDER — LEVOTHYROXINE SODIUM 200 MCG
TABLET ORAL
Qty: 90 TABLET | Refills: 4 | Status: SHIPPED | OUTPATIENT
Start: 2020-02-12 | End: 2021-05-04

## 2020-02-28 DIAGNOSIS — I10 HYPERTENSION, UNSPECIFIED TYPE: ICD-10-CM

## 2020-02-28 RX ORDER — NIFEDIPINE 60 MG/1
TABLET, EXTENDED RELEASE ORAL
Qty: 90 TABLET | Refills: 4 | Status: SHIPPED | OUTPATIENT
Start: 2020-02-28 | End: 2021-05-26

## 2020-08-12 ENCOUNTER — TELEPHONE (OUTPATIENT)
Dept: ENDOCRINOLOGY | Facility: HOSPITAL | Age: 75
End: 2020-08-12

## 2020-08-14 LAB
ALBUMIN SERPL-MCNC: 4.2 G/DL (ref 3.6–5.1)
ALBUMIN/GLOB SERPL: 1.4 (CALC) (ref 1–2.5)
ALP SERPL-CCNC: 57 U/L (ref 37–153)
ALT SERPL-CCNC: 20 U/L (ref 6–29)
AST SERPL-CCNC: 23 U/L (ref 10–35)
BASOPHILS # BLD AUTO: 96 CELLS/UL (ref 0–200)
BASOPHILS NFR BLD AUTO: 0.8 %
BILIRUB SERPL-MCNC: 1.1 MG/DL (ref 0.2–1.2)
BUN SERPL-MCNC: 14 MG/DL (ref 7–25)
BUN/CREAT SERPL: ABNORMAL (CALC) (ref 6–22)
CALCIUM SERPL-MCNC: 9.6 MG/DL (ref 8.6–10.4)
CHLORIDE SERPL-SCNC: 111 MMOL/L (ref 98–110)
CHOLEST SERPL-MCNC: 168 MG/DL
CHOLEST/HDLC SERPL: 5.1 (CALC)
CO2 SERPL-SCNC: 25 MMOL/L (ref 20–32)
CREAT SERPL-MCNC: 0.93 MG/DL (ref 0.6–0.93)
EOSINOPHIL # BLD AUTO: 216 CELLS/UL (ref 15–500)
EOSINOPHIL NFR BLD AUTO: 1.8 %
ERYTHROCYTE [DISTWIDTH] IN BLOOD BY AUTOMATED COUNT: 13.6 % (ref 11–15)
EST. AVERAGE GLUCOSE BLD GHB EST-MCNC: 94 (CALC)
EST. AVERAGE GLUCOSE BLD GHB EST-SCNC: 5.2 (CALC)
GLOBULIN SER CALC-MCNC: 3 G/DL (CALC) (ref 1.9–3.7)
GLUCOSE SERPL-MCNC: 99 MG/DL (ref 65–139)
HBA1C MFR BLD: 4.9 % OF TOTAL HGB
HCT VFR BLD AUTO: 45.6 % (ref 35–45)
HDLC SERPL-MCNC: 33 MG/DL
HGB BLD-MCNC: 15.5 G/DL (ref 11.7–15.5)
LDLC SERPL CALC-MCNC: 89 MG/DL (CALC)
LYMPHOCYTES # BLD AUTO: 4992 CELLS/UL (ref 850–3900)
LYMPHOCYTES NFR BLD AUTO: 41.6 %
MCH RBC QN AUTO: 31.1 PG (ref 27–33)
MCHC RBC AUTO-ENTMCNC: 34 G/DL (ref 32–36)
MCV RBC AUTO: 91.4 FL (ref 80–100)
MONOCYTES # BLD AUTO: 600 CELLS/UL (ref 200–950)
MONOCYTES NFR BLD AUTO: 5 %
NEUTROPHILS # BLD AUTO: 6096 CELLS/UL (ref 1500–7800)
NEUTROPHILS NFR BLD AUTO: 50.8 %
NONHDLC SERPL-MCNC: 135 MG/DL (CALC)
PLATELET # BLD AUTO: 213 THOUSAND/UL (ref 140–400)
PMV BLD REES-ECKER: 11.9 FL (ref 7.5–12.5)
POTASSIUM SERPL-SCNC: 4.3 MMOL/L (ref 3.5–5.3)
PROT SERPL-MCNC: 7.2 G/DL (ref 6.1–8.1)
RBC # BLD AUTO: 4.99 MILLION/UL (ref 3.8–5.1)
SL AMB EGFR AFRICAN AMERICAN: 69 ML/MIN/1.73M2
SL AMB EGFR NON AFRICAN AMERICAN: 60 ML/MIN/1.73M2
SODIUM SERPL-SCNC: 145 MMOL/L (ref 135–146)
TRIGL SERPL-MCNC: 344 MG/DL
TSH SERPL-ACNC: 1.1 MIU/L (ref 0.4–4.5)
WBC # BLD AUTO: 12 THOUSAND/UL (ref 3.8–10.8)

## 2020-12-01 ENCOUNTER — TELEMEDICINE (OUTPATIENT)
Dept: ENDOCRINOLOGY | Facility: HOSPITAL | Age: 75
End: 2020-12-01
Payer: COMMERCIAL

## 2020-12-01 DIAGNOSIS — E78.5 HYPERLIPIDEMIA, UNSPECIFIED HYPERLIPIDEMIA TYPE: ICD-10-CM

## 2020-12-01 DIAGNOSIS — I10 ESSENTIAL HYPERTENSION: ICD-10-CM

## 2020-12-01 DIAGNOSIS — E03.9 HYPOTHYROIDISM, UNSPECIFIED TYPE: Primary | ICD-10-CM

## 2020-12-01 PROCEDURE — 99441 PR PHYS/QHP TELEPHONE EVALUATION 5-10 MIN: CPT | Performed by: INTERNAL MEDICINE

## 2021-05-04 DIAGNOSIS — E03.9 HYPOTHYROIDISM, UNSPECIFIED TYPE: ICD-10-CM

## 2021-05-04 RX ORDER — LEVOTHYROXINE SODIUM 200 MCG
TABLET ORAL
Qty: 90 TABLET | Refills: 3 | Status: SHIPPED | OUTPATIENT
Start: 2021-05-04 | End: 2022-04-25 | Stop reason: SDUPTHER

## 2021-05-26 DIAGNOSIS — I10 HYPERTENSION, UNSPECIFIED TYPE: ICD-10-CM

## 2021-05-26 RX ORDER — NIFEDIPINE 60 MG/1
TABLET, EXTENDED RELEASE ORAL
Qty: 90 TABLET | Refills: 3 | Status: SHIPPED | OUTPATIENT
Start: 2021-05-26 | End: 2022-04-25 | Stop reason: SDUPTHER

## 2021-07-23 LAB
ALBUMIN SERPL-MCNC: 4 G/DL (ref 3.6–5.1)
ALBUMIN/GLOB SERPL: 1.4 (CALC) (ref 1–2.5)
ALP SERPL-CCNC: 44 U/L (ref 37–153)
ALT SERPL-CCNC: 18 U/L (ref 6–29)
AST SERPL-CCNC: 21 U/L (ref 10–35)
BILIRUB SERPL-MCNC: 1.3 MG/DL (ref 0.2–1.2)
BUN SERPL-MCNC: 17 MG/DL (ref 7–25)
BUN/CREAT SERPL: ABNORMAL (CALC) (ref 6–22)
CALCIUM SERPL-MCNC: 9.2 MG/DL (ref 8.6–10.4)
CHLORIDE SERPL-SCNC: 111 MMOL/L (ref 98–110)
CHOLEST SERPL-MCNC: 169 MG/DL
CHOLEST/HDLC SERPL: 4.7 (CALC)
CO2 SERPL-SCNC: 26 MMOL/L (ref 20–32)
CREAT SERPL-MCNC: 0.9 MG/DL (ref 0.6–0.93)
EST. AVERAGE GLUCOSE BLD GHB EST-MCNC: 85 (CALC)
EST. AVERAGE GLUCOSE BLD GHB EST-SCNC: 4.7 (CALC)
GLOBULIN SER CALC-MCNC: 2.8 G/DL (CALC) (ref 1.9–3.7)
GLUCOSE SERPL-MCNC: 110 MG/DL (ref 65–99)
HBA1C MFR BLD: 4.6 % OF TOTAL HGB
HDLC SERPL-MCNC: 36 MG/DL
LDLC SERPL CALC-MCNC: 84 MG/DL (CALC)
NONHDLC SERPL-MCNC: 133 MG/DL (CALC)
POTASSIUM SERPL-SCNC: 4.3 MMOL/L (ref 3.5–5.3)
PROT SERPL-MCNC: 6.8 G/DL (ref 6.1–8.1)
SL AMB EGFR AFRICAN AMERICAN: 71 ML/MIN/1.73M2
SL AMB EGFR NON AFRICAN AMERICAN: 62 ML/MIN/1.73M2
SODIUM SERPL-SCNC: 144 MMOL/L (ref 135–146)
T4 FREE SERPL-MCNC: 1.3 NG/DL (ref 0.8–1.8)
TRIGL SERPL-MCNC: 366 MG/DL
TSH SERPL-ACNC: 0.84 MIU/L (ref 0.4–4.5)

## 2021-08-05 ENCOUNTER — OFFICE VISIT (OUTPATIENT)
Dept: ENDOCRINOLOGY | Facility: HOSPITAL | Age: 76
End: 2021-08-05
Payer: COMMERCIAL

## 2021-08-05 VITALS
HEART RATE: 59 BPM | DIASTOLIC BLOOD PRESSURE: 72 MMHG | HEIGHT: 65 IN | BODY MASS INDEX: 41.45 KG/M2 | SYSTOLIC BLOOD PRESSURE: 120 MMHG | WEIGHT: 248.8 LBS

## 2021-08-05 DIAGNOSIS — E78.5 HYPERLIPIDEMIA, UNSPECIFIED HYPERLIPIDEMIA TYPE: ICD-10-CM

## 2021-08-05 DIAGNOSIS — E03.9 HYPOTHYROIDISM, UNSPECIFIED TYPE: Primary | ICD-10-CM

## 2021-08-05 PROCEDURE — 99213 OFFICE O/P EST LOW 20 MIN: CPT | Performed by: INTERNAL MEDICINE

## 2021-08-05 RX ORDER — ROSUVASTATIN CALCIUM 10 MG/1
TABLET, COATED ORAL
COMMUNITY
Start: 2021-07-26

## 2021-08-05 RX ORDER — PREDNISOLONE ACETATE 10 MG/ML
SUSPENSION/ DROPS OPHTHALMIC
COMMUNITY
Start: 2021-08-04

## 2021-08-05 NOTE — PROGRESS NOTES
8/5/2021    Assessment/Plan      Diagnoses and all orders for this visit:    Hypothyroidism, unspecified type  -     TSH, 3rd generation; Future  -     T4, free; Future  -     Comprehensive metabolic panel; Future    Hyperlipidemia, unspecified hyperlipidemia type    Other orders  -     rosuvastatin (CRESTOR) 10 MG tablet  -     prednisoLONE acetate (PRED FORTE) 1 % ophthalmic suspension        Assessment/Plan:    1  Hypothyroidism: Clinically and biochemically doing well on current regimen  Continue current regimen and  Follow-up in 1 year with labs as ordered above just prior  CC: Follow-up    History of Present Illness     HPI: Tiffanie Humphrey is a 66y o  year old female with history of  Hypothyroidism who presents for a follow-up appointment maintained on Synthroid brand 200 mcg daily  She presents today overall feeling well  She was recently started on rosuvastatin 10 mg daily by her PCP  Has follow-up with PCP and cardiology in the near future  Review of Systems   Constitutional: Negative for fatigue  HENT: Negative for trouble swallowing and voice change  Eyes: Negative for visual disturbance  Respiratory: Negative for shortness of breath  Cardiovascular: Negative for palpitations and leg swelling  Gastrointestinal: Negative for abdominal pain, nausea and vomiting  Endocrine: Negative for polydipsia and polyuria  Musculoskeletal: Negative for arthralgias and myalgias  Skin: Negative for rash  Neurological: Negative for dizziness, tremors and weakness  Hematological: Negative for adenopathy  Psychiatric/Behavioral: Negative for agitation and confusion  Historical Information   History reviewed  No pertinent past medical history  History reviewed  No pertinent surgical history    Social History   Social History     Substance and Sexual Activity   Alcohol Use None     Social History     Substance and Sexual Activity   Drug Use Not on file     Social History     Tobacco Use   Smoking Status Never Smoker   Smokeless Tobacco Never Used     Family History:   Family History   Problem Relation Age of Onset    Colon cancer Father     Diabetes unspecified Sister        Meds/Allergies   Current Outpatient Medications   Medication Sig Dispense Refill    aspirin 81 MG tablet Take 81 mg by mouth      atenolol (TENORMIN) 25 mg tablet TAKE 1 TABLET TWICE A DAY (Patient taking differently: Take 25 mg by mouth daily ) 180 tablet 3    ELIQUIS 5 MG Take 5 mg by mouth 2 (two) times a day  11    Misc Natural Products (TURMERIC CURCUMIN) CAPS Take by mouth      Multiple Vitamins-Minerals (MULTIVITAMIN ADULT PO) Take 1 tablet by mouth      NIFEdipine (PROCARDIA XL) 60 mg 24 hr tablet TAKE 1 TABLET DAILY 90 tablet 3    rosuvastatin (CRESTOR) 10 MG tablet       sertraline (ZOLOFT) 50 mg tablet Take 100 mg by mouth daily        Synthroid 200 MCG tablet TAKE 1 TABLET DAILY 90 tablet 3    Ascorbic Acid (VITAMIN C) 1000 MG tablet Take 1,000 mg by mouth (Patient not taking: Reported on 8/5/2021)      prednisoLONE acetate (PRED FORTE) 1 % ophthalmic suspension        No current facility-administered medications for this visit  No Known Allergies    Objective   Vitals: Blood pressure 120/72, pulse 59, height 5' 5" (1 651 m), weight 113 kg (248 lb 12 8 oz)  Invasive Devices     None                 Physical Exam  Vitals reviewed  Constitutional:       General: She is not in acute distress  Appearance: She is well-developed  She is not diaphoretic  HENT:      Head: Normocephalic and atraumatic  Eyes:      Conjunctiva/sclera: Conjunctivae normal       Pupils: Pupils are equal, round, and reactive to light  Neck:      Thyroid: No thyromegaly  Cardiovascular:      Rate and Rhythm: Normal rate and regular rhythm  Pulmonary:      Effort: Pulmonary effort is normal  No respiratory distress  Breath sounds: Normal breath sounds     Abdominal:      General: Bowel sounds are normal  Palpations: Abdomen is soft  Musculoskeletal:         General: Normal range of motion  Cervical back: Normal range of motion and neck supple  Skin:     General: Skin is warm and dry  Findings: No rash  Neurological:      Mental Status: She is alert and oriented to person, place, and time  Motor: No abnormal muscle tone  Psychiatric:         Behavior: Behavior normal          The history was obtained from the review of the chart and from the patient      Lab Results:      Recent Results (from the past 16628 hour(s))   Lipid Panel with Direct LDL reflex    Collection Time: 08/13/20  9:34 AM   Result Value Ref Range    Total Cholesterol 168 <200 mg/dL    HDL 33 (L) > OR = 50 mg/dL    Triglycerides 344 (H) <150 mg/dL    LDL Calculated 89 mg/dL (calc)    Chol HDLC Ratio 5 1 (H) <5 0 (calc)    Non-HDL Cholesterol 135 (H) <130 mg/dL (calc)   Comprehensive metabolic panel    Collection Time: 08/13/20  9:34 AM   Result Value Ref Range    Glucose, Random 99 65 - 139 mg/dL    BUN 14 7 - 25 mg/dL    Creatinine 0 93 0 60 - 0 93 mg/dL    eGFR Non  60 > OR = 60 mL/min/1 73m2    eGFR  69 > OR = 60 mL/min/1 73m2    SL AMB BUN/CREATININE RATIO NOT APPLICABLE 6 - 22 (calc)    Sodium 145 135 - 146 mmol/L    Potassium 4 3 3 5 - 5 3 mmol/L    Chloride 111 (H) 98 - 110 mmol/L    CO2 25 20 - 32 mmol/L    Calcium 9 6 8 6 - 10 4 mg/dL    Protein, Total 7 2 6 1 - 8 1 g/dL    Albumin 4 2 3 6 - 5 1 g/dL    Globulin 3 0 1 9 - 3 7 g/dL (calc)    Albumin/Globulin Ratio 1 4 1 0 - 2 5 (calc)    TOTAL BILIRUBIN 1 1 0 2 - 1 2 mg/dL    Alkaline Phosphatase 57 37 - 153 U/L    AST 23 10 - 35 U/L    ALT 20 6 - 29 U/L   CBC and differential    Collection Time: 08/13/20  9:34 AM   Result Value Ref Range    White Blood Cell Count 12 0 (H) 3 8 - 10 8 Thousand/uL    Red Blood Cell Count 4 99 3 80 - 5 10 Million/uL    Hemoglobin 15 5 11 7 - 15 5 g/dL    HCT 45 6 (H) 35 0 - 45 0 %    MCV 91 4 80 0 - 100 0 fL    MCH 31 1 27 0 - 33 0 pg    MCHC 34 0 32 0 - 36 0 g/dL    RDW 13 6 11 0 - 15 0 %    Platelet Count 235 186 - 400 Thousand/uL    SL AMB MPV 11 9 7 5 - 12 5 fL    Neutrophils (Absolute) 6,096 1,500 - 7,800 cells/uL    Lymphocytes (Absolute) 4,992 (H) 850 - 3,900 cells/uL    Monocytes (Absolute) 600 200 - 950 cells/uL    Eosinophils (Absolute) 216 15 - 500 cells/uL    Basophils ABS 96 0 - 200 cells/uL    Neutrophils 50 8 %    Lymphocytes 41 6 %    Monocytes 5 0 %    Eosinophils 1 8 %    Basophils PCT 0 8 %   TSH, 3rd generation    Collection Time: 08/13/20  9:34 AM   Result Value Ref Range    TSH 1 10 0 40 - 4 50 mIU/L   Hemoglobin A1C With EAG    Collection Time: 08/13/20  9:34 AM   Result Value Ref Range    Hemoglobin A1C 4 9 <5 7 % of total Hgb    Estimated Average Glucose 94 (calc)    Estimated Average Glucose (mmol/L) 5 2 (calc)   Lipid Panel with Direct LDL reflex    Collection Time: 07/23/21 11:10 AM   Result Value Ref Range    Total Cholesterol 169 <200 mg/dL    HDL 36 (L) > OR = 50 mg/dL    Triglycerides 366 (H) <150 mg/dL    LDL Calculated 84 mg/dL (calc)    Chol HDLC Ratio 4 7 <5 0 (calc)    Non-HDL Cholesterol 133 (H) <130 mg/dL (calc)   Comprehensive metabolic panel    Collection Time: 07/23/21 11:10 AM   Result Value Ref Range    Glucose, Random 110 (H) 65 - 99 mg/dL    BUN 17 7 - 25 mg/dL    Creatinine 0 90 0 60 - 0 93 mg/dL    eGFR Non  62 > OR = 60 mL/min/1 73m2    eGFR  71 > OR = 60 mL/min/1 73m2    SL AMB BUN/CREATININE RATIO NOT APPLICABLE 6 - 22 (calc)    Sodium 144 135 - 146 mmol/L    Potassium 4 3 3 5 - 5 3 mmol/L    Chloride 111 (H) 98 - 110 mmol/L    CO2 26 20 - 32 mmol/L    Calcium 9 2 8 6 - 10 4 mg/dL    Protein, Total 6 8 6 1 - 8 1 g/dL    Albumin 4 0 3 6 - 5 1 g/dL    Globulin 2 8 1 9 - 3 7 g/dL (calc)    Albumin/Globulin Ratio 1 4 1 0 - 2 5 (calc)    TOTAL BILIRUBIN 1 3 (H) 0 2 - 1 2 mg/dL    Alkaline Phosphatase 44 37 - 153 U/L    AST 21 10 - 35 U/L ALT 18 6 - 29 U/L   T4, free    Collection Time: 07/23/21 11:10 AM   Result Value Ref Range    Free t4 1 3 0 8 - 1 8 ng/dL   TSH, 3rd generation    Collection Time: 07/23/21 11:10 AM   Result Value Ref Range    TSH 0 84 0 40 - 4 50 mIU/L   Hemoglobin A1C With EAG    Collection Time: 07/23/21 11:10 AM   Result Value Ref Range    Hemoglobin A1C 4 6 <5 7 % of total Hgb    Estimated Average Glucose 85 (calc)    Estimated Average Glucose (mmol/L) 4 7 (calc)         No future appointments  Portions of the record may have been created with voice recognition software  Occasional wrong word or "sound a like" substitutions may have occurred due to the inherent limitations of voice recognition software  Read the chart carefully and recognize, using context, where substitutions have occurred

## 2022-04-25 DIAGNOSIS — I10 HYPERTENSION, UNSPECIFIED TYPE: ICD-10-CM

## 2022-04-25 DIAGNOSIS — E03.9 HYPOTHYROIDISM, UNSPECIFIED TYPE: ICD-10-CM

## 2022-04-25 RX ORDER — NIFEDIPINE 60 MG/1
60 TABLET, EXTENDED RELEASE ORAL DAILY
Qty: 90 TABLET | Refills: 3 | Status: SHIPPED | OUTPATIENT
Start: 2022-04-25 | End: 2022-07-06

## 2022-04-25 RX ORDER — LEVOTHYROXINE SODIUM 200 MCG
200 TABLET ORAL DAILY
Qty: 90 TABLET | Refills: 3 | Status: SHIPPED | OUTPATIENT
Start: 2022-04-25 | End: 2022-07-06

## 2022-07-05 DIAGNOSIS — I10 HYPERTENSION, UNSPECIFIED TYPE: ICD-10-CM

## 2022-07-05 DIAGNOSIS — E03.9 HYPOTHYROIDISM, UNSPECIFIED TYPE: ICD-10-CM

## 2022-07-06 RX ORDER — NIFEDIPINE 60 MG/1
TABLET, EXTENDED RELEASE ORAL
Qty: 90 TABLET | Refills: 3 | Status: SHIPPED | OUTPATIENT
Start: 2022-07-06 | End: 2022-07-18

## 2022-07-06 RX ORDER — LEVOTHYROXINE SODIUM 200 MCG
TABLET ORAL
Qty: 90 TABLET | Refills: 3 | Status: SHIPPED | OUTPATIENT
Start: 2022-07-06 | End: 2022-07-18

## 2022-07-15 DIAGNOSIS — I10 HYPERTENSION, UNSPECIFIED TYPE: ICD-10-CM

## 2022-07-15 DIAGNOSIS — E03.9 HYPOTHYROIDISM, UNSPECIFIED TYPE: ICD-10-CM

## 2022-07-15 NOTE — TELEPHONE ENCOUNTER
Pt left message that prescriptions were not received at pharmacy on 7/6  Called patient back, left message requesting call back to advise what pharmacy they should go to, as she has two listed in her chart

## 2022-07-18 DIAGNOSIS — I10 HYPERTENSION, UNSPECIFIED TYPE: ICD-10-CM

## 2022-07-18 DIAGNOSIS — E03.9 HYPOTHYROIDISM, UNSPECIFIED TYPE: ICD-10-CM

## 2022-07-18 RX ORDER — NIFEDIPINE 60 MG/1
TABLET, EXTENDED RELEASE ORAL
Qty: 90 TABLET | Refills: 3 | Status: SHIPPED | OUTPATIENT
Start: 2022-07-18

## 2022-07-18 RX ORDER — LEVOTHYROXINE SODIUM 200 MCG
TABLET ORAL
Qty: 90 TABLET | Refills: 3 | Status: SHIPPED | OUTPATIENT
Start: 2022-07-18

## 2022-08-08 ENCOUNTER — TELEPHONE (OUTPATIENT)
Dept: ENDOCRINOLOGY | Facility: CLINIC | Age: 77
End: 2022-08-08

## 2022-08-08 DIAGNOSIS — E03.9 HYPOTHYROIDISM, UNSPECIFIED TYPE: Primary | ICD-10-CM

## 2022-08-08 NOTE — TELEPHONE ENCOUNTER
There is an order in her chart already for repeat labs but it's   Do you want anything else added to those labs? Orders can be mailed to patient  No need to call her back

## 2022-08-17 LAB — HBA1C MFR BLD HPLC: 4.7 %

## 2022-09-01 ENCOUNTER — OFFICE VISIT (OUTPATIENT)
Dept: ENDOCRINOLOGY | Facility: CLINIC | Age: 77
End: 2022-09-01
Payer: COMMERCIAL

## 2022-09-01 VITALS
WEIGHT: 250 LBS | HEART RATE: 54 BPM | SYSTOLIC BLOOD PRESSURE: 120 MMHG | BODY MASS INDEX: 40.18 KG/M2 | DIASTOLIC BLOOD PRESSURE: 78 MMHG | HEIGHT: 66 IN

## 2022-09-01 DIAGNOSIS — E03.9 HYPOTHYROIDISM, UNSPECIFIED TYPE: Primary | ICD-10-CM

## 2022-09-01 PROBLEM — E78.49 OTHER HYPERLIPIDEMIA: Status: ACTIVE | Noted: 2022-09-01

## 2022-09-01 PROCEDURE — 99213 OFFICE O/P EST LOW 20 MIN: CPT | Performed by: INTERNAL MEDICINE

## 2022-09-01 NOTE — PROGRESS NOTES
9/1/2022    Assessment/Plan      Diagnoses and all orders for this visit:    Hypothyroidism, unspecified type  -     TSH, 3rd generation; Future  -     T4, free; Future        Assessment/Plan:  Hypothyroidism:  Clinically biochemically overall doing well on current regimen  We will continue current regimen and see her back in the office in 1 year with labs just prior  She will call sooner with any questions or concerns prior      CC: Follow-up    History of Present Illness     HPI: Catalina Katz is a 64y o  year old female with history of will thyroidism who presents for a follow-up appointment  She continues on Synthroid brand 200 mcg daily and takes medication appropriately  She continues on rosuvastatin 10 mg daily for hyperlipidemia managed by PCP  She presents today overall feeling well  No neck compressive symptoms  Overall no concerns regarding symptoms of hypothyroidism such as fatigue, cold intolerance  Review of Systems   Constitutional: Negative for fatigue  HENT: Negative for trouble swallowing and voice change  Eyes: Negative for visual disturbance  Respiratory: Negative for shortness of breath  Cardiovascular: Negative for palpitations and leg swelling  Gastrointestinal: Negative for abdominal pain, nausea and vomiting  Endocrine: Negative for polydipsia and polyuria  Musculoskeletal: Negative for arthralgias and myalgias  Skin: Negative for rash  Neurological: Negative for dizziness, tremors and weakness  Hematological: Negative for adenopathy  Psychiatric/Behavioral: Negative for agitation and confusion         Historical Information   Past Medical History:   Diagnosis Date    [facility] ([facility] [address] [facility]     Elevated troponin     Essential hypertension     Hypothyroidism     Pacemaker     [facility]      Past Surgical History:   Procedure Laterality Date    CARDIAC PACEMAKER PLACEMENT  2012    CATARACT EXTRACTION, BILATERAL Bilateral Social History   Social History     Substance and Sexual Activity   Alcohol Use Not Currently     Social History     Substance and Sexual Activity   Drug Use Never     Social History     Tobacco Use   Smoking Status Never Smoker   Smokeless Tobacco Never Used     Family History:   Family History   Problem Relation Age of Onset    Colon cancer Father     Diabetes type II Sister     Diabetes type II Sister        Meds/Allergies   Current Outpatient Medications   Medication Sig Dispense Refill    aspirin 81 MG tablet Take 81 mg by mouth daily      atenolol (TENORMIN) 25 mg tablet TAKE 1 TABLET TWICE A DAY (Patient taking differently: Take 25 mg by mouth daily) 180 tablet 3    ELIQUIS 5 MG Take 5 mg by mouth 2 (two) times a day  11    Multiple Vitamins-Minerals (MULTIVITAMIN ADULT PO) Take 1 tablet by mouth daily      NIFEdipine (PROCARDIA XL) 60 mg 24 hr tablet TAKE 1 TABLET DAILY 90 tablet 3    prednisoLONE acetate (PRED FORTE) 1 % ophthalmic suspension Administer 1 drop to the right eye every other day      rosuvastatin (CRESTOR) 10 MG tablet Take 10 mg by mouth daily      sertraline (ZOLOFT) 100 mg tablet Take 100 mg by mouth daily        Synthroid 200 MCG tablet TAKE 1 TABLET EVERY DAY 90 tablet 3     No current facility-administered medications for this visit  No Known Allergies    Objective   Vitals: Blood pressure 120/78, pulse (!) 54, height 5' 5 5" (1 664 m), weight 113 kg (250 lb)  Invasive Devices  Report    None                 Physical Exam  Vitals reviewed  Constitutional:       General: She is not in acute distress  Appearance: She is well-developed  She is not diaphoretic  HENT:      Head: Normocephalic and atraumatic  Eyes:      Conjunctiva/sclera: Conjunctivae normal       Pupils: Pupils are equal, round, and reactive to light  Neck:      Thyroid: No thyromegaly  Cardiovascular:      Rate and Rhythm: Normal rate and regular rhythm     Pulmonary:      Effort: Pulmonary effort is normal  No respiratory distress  Breath sounds: Normal breath sounds  Abdominal:      General: Bowel sounds are normal       Palpations: Abdomen is soft  Musculoskeletal:         General: Normal range of motion  Cervical back: Normal range of motion and neck supple  Skin:     General: Skin is warm and dry  Findings: No rash  Neurological:      Mental Status: She is alert and oriented to person, place, and time  Motor: No abnormal muscle tone  Psychiatric:         Behavior: Behavior normal          The history was obtained from the review of the chart and from the patient      Lab Results:      Recent Results (from the past 44365 hour(s))   Lipid Panel with Direct LDL reflex    Collection Time: 07/23/21 11:10 AM   Result Value Ref Range    Total Cholesterol 169 <200 mg/dL    HDL 36 (L) > OR = 50 mg/dL    Triglycerides 366 (H) <150 mg/dL    LDL Calculated 84 mg/dL (calc)    Chol HDLC Ratio 4 7 <5 0 (calc)    Non-HDL Cholesterol 133 (H) <130 mg/dL (calc)   Comprehensive metabolic panel    Collection Time: 07/23/21 11:10 AM   Result Value Ref Range    Glucose, Random 110 (H) 65 - 99 mg/dL    BUN 17 7 - 25 mg/dL    Creatinine 0 90 0 60 - 0 93 mg/dL    eGFR Non  62 > OR = 60 mL/min/1 73m2    eGFR  71 > OR = 60 mL/min/1 73m2    SL AMB BUN/CREATININE RATIO NOT APPLICABLE 6 - 22 (calc)    Sodium 144 135 - 146 mmol/L    Potassium 4 3 3 5 - 5 3 mmol/L    Chloride 111 (H) 98 - 110 mmol/L    CO2 26 20 - 32 mmol/L    Calcium 9 2 8 6 - 10 4 mg/dL    Protein, Total 6 8 6 1 - 8 1 g/dL    Albumin 4 0 3 6 - 5 1 g/dL    Globulin 2 8 1 9 - 3 7 g/dL (calc)    Albumin/Globulin Ratio 1 4 1 0 - 2 5 (calc)    TOTAL BILIRUBIN 1 3 (H) 0 2 - 1 2 mg/dL    Alkaline Phosphatase 44 37 - 153 U/L    AST 21 10 - 35 U/L    ALT 18 6 - 29 U/L   T4, free    Collection Time: 07/23/21 11:10 AM   Result Value Ref Range    Free t4 1 3 0 8 - 1 8 ng/dL   TSH, 3rd generation    Collection Time: 07/23/21 11:10 AM   Result Value Ref Range    TSH 0 84 0 40 - 4 50 mIU/L   Hemoglobin A1C With EAG    Collection Time: 07/23/21 11:10 AM   Result Value Ref Range    Hemoglobin A1C 4 6 <5 7 % of total Hgb    Estimated Average Glucose 85 (calc)    Estimated Average Glucose (mmol/L) 4 7 (calc)   Hemoglobin A1C    Collection Time: 08/17/22 12:00 AM   Result Value Ref Range    Hemoglobin A1C 4 7      TSH 0 48, glucose 107    No future appointments  Portions of the record may have been created with voice recognition software  Occasional wrong word or "sound a like" substitutions may have occurred due to the inherent limitations of voice recognition software  Read the chart carefully and recognize, using context, where substitutions have occurred

## 2023-01-31 NOTE — ADDENDUM NOTE
Addended by: Jaguar Chan on: 8/8/2022 12:42 PM     Modules accepted: Orders (1/30) edema: +1 left ankle; right ankle

## 2023-07-12 DIAGNOSIS — E03.9 HYPOTHYROIDISM, UNSPECIFIED TYPE: ICD-10-CM

## 2023-07-12 DIAGNOSIS — I10 HYPERTENSION, UNSPECIFIED TYPE: ICD-10-CM

## 2023-07-12 RX ORDER — NIFEDIPINE 60 MG/1
TABLET, EXTENDED RELEASE ORAL
Qty: 90 TABLET | Refills: 0 | OUTPATIENT
Start: 2023-07-12

## 2023-07-12 RX ORDER — LEVOTHYROXINE SODIUM 200 MCG
TABLET ORAL
Qty: 90 TABLET | Refills: 0 | OUTPATIENT
Start: 2023-07-12

## 2023-07-12 NOTE — TELEPHONE ENCOUNTER
Requested medication(s) are due for refill today: Yes  Patient has already received a courtesy refill: Yes  Other reason request has been forwarded to provider:   Cardiovascular:  Calcium Channel Blockers Failed 07/12/2023 06:32 AM   Protocol Details  BP completed in the last 6 months    Valid encounter within last 6 months

## 2023-07-12 NOTE — TELEPHONE ENCOUNTER
Please send the Synthroid separately. The Nifedipine should come from her PCP. I think Celeste Lamb may have accidentally signed the RX last year, but she is seen for hypothyroidism and we are not management hypertension or cardiac issues.  Please has her request the drugs separately

## 2023-08-16 LAB
T4 FREE SERPL-MCNC: 1.2 NG/DL (ref 0.8–1.8)
TSH SERPL-ACNC: 0.18 MIU/L (ref 0.4–4.5)

## 2023-10-30 ENCOUNTER — OFFICE VISIT (OUTPATIENT)
Dept: ENDOCRINOLOGY | Facility: CLINIC | Age: 78
End: 2023-10-30
Payer: COMMERCIAL

## 2023-10-30 VITALS
HEART RATE: 55 BPM | SYSTOLIC BLOOD PRESSURE: 122 MMHG | WEIGHT: 253.2 LBS | BODY MASS INDEX: 42.19 KG/M2 | OXYGEN SATURATION: 97 % | HEIGHT: 65 IN | DIASTOLIC BLOOD PRESSURE: 64 MMHG

## 2023-10-30 DIAGNOSIS — E03.9 HYPOTHYROIDISM, UNSPECIFIED TYPE: ICD-10-CM

## 2023-10-30 DIAGNOSIS — R94.6 ABNORMAL THYROID FUNCTION TEST: Primary | ICD-10-CM

## 2023-10-30 PROCEDURE — 99213 OFFICE O/P EST LOW 20 MIN: CPT | Performed by: INTERNAL MEDICINE

## 2023-10-30 RX ORDER — ERYTHROMYCIN 5 MG/G
OINTMENT OPHTHALMIC
COMMUNITY
Start: 2023-09-20

## 2023-10-30 RX ORDER — LEVOTHYROXINE SODIUM 200 MCG
200 TABLET ORAL DAILY
Qty: 90 TABLET | Refills: 3 | Status: SHIPPED | OUTPATIENT
Start: 2023-10-30

## 2023-10-30 RX ORDER — ACETAMINOPHEN 325 MG/1
650 TABLET ORAL EVERY 6 HOURS PRN
COMMUNITY

## 2023-10-30 NOTE — PROGRESS NOTES
10/30/2023    Assessment/Plan      Diagnoses and all orders for this visit:    Abnormal thyroid function test  -     TSH, 3rd generation; Future  -     T4, free; Future  -     TSH, 3rd generation  -     T4, free    Hypothyroidism, unspecified type  -     TSH, 3rd generation; Future  -     T4, free; Future  -     TSH, 3rd generation  -     T4, free  -     Synthroid 200 MCG tablet; Take 1 tablet (200 mcg total) by mouth daily    Other orders  -     acetaminophen (TYLENOL) 325 mg tablet; Take 650 mg by mouth every 6 (six) hours as needed  -     erythromycin (ILOTYCIN) ophthalmic ointment; APPLY TO incision LEFT lower LID 3 TIMES DAILY        Assessment/Plan:  Hypothyroidism: Recent TSH is low. She may very well need a dose reduction in her Synthroid however we will repeat labs in about 1-2 weeks to see if there is any element of euthyroid sick syndrome on recent labs as per HPI. We will contact her with results. Maintain annual follow-up visits. CC: Follow-up    History of Present Illness     HPI: Suhail Henriquez is a 78y.o. year old female with history of hypothyroidism who presents for a follow-up appointment. She continues on Synthroid brand 200 mcg daily and takes medication appropriately. She presents today overall feeling well. She does states she was having some procedures done for cataracts in the recent past and also preceding her recent labs. No symptoms of hyperthyroidism that we reviewed. Review of Systems   Constitutional:  Negative for fatigue. HENT:  Negative for trouble swallowing and voice change. Eyes:  Negative for visual disturbance. Respiratory:  Negative for shortness of breath. Cardiovascular:  Negative for palpitations and leg swelling. Gastrointestinal:  Negative for abdominal pain, nausea and vomiting. Endocrine: Negative for polydipsia and polyuria. Musculoskeletal:  Negative for arthralgias and myalgias. Skin:  Negative for rash.    Neurological:  Negative for dizziness, tremors and weakness. Hematological:  Negative for adenopathy. Psychiatric/Behavioral:  Negative for agitation and confusion.         Historical Information   Past Medical History:   Diagnosis Date    Atrial fibrillation (HCC)     Elevated troponin     Essential hypertension     Hypothyroidism     Pacemaker     Pulmonary thromboembolism ([address])      Past Surgical History:   Procedure Laterality Date    CARDIAC PACEMAKER PLACEMENT  2012    CATARACT EXTRACTION, BILATERAL Bilateral      Social History   Social History     Substance and Sexual Activity   Alcohol Use Not Currently     Social History     Substance and Sexual Activity   Drug Use Never     Social History     Tobacco Use   Smoking Status Never   Smokeless Tobacco Never     Family History:   Family History   Problem Relation Age of Onset    Colon cancer Father     Diabetes type II Sister     Diabetes type II Sister        Meds/Allergies   Current Outpatient Medications   Medication Sig Dispense Refill    acetaminophen (TYLENOL) 325 mg tablet Take 650 mg by mouth every 6 (six) hours as needed      aspirin 81 MG tablet Take 81 mg by mouth daily      atenolol (TENORMIN) 25 mg tablet TAKE 1 TABLET TWICE A DAY (Patient taking differently: Take 25 mg by mouth daily) 180 tablet 3    ELIQUIS 5 MG Take 5 mg by mouth 2 (two) times a day  11    erythromycin (ILOTYCIN) ophthalmic ointment APPLY TO incision LEFT lower LID 3 TIMES DAILY      Multiple Vitamins-Minerals (MULTIVITAMIN ADULT PO) Take 1 tablet by mouth daily      NIFEdipine (PROCARDIA XL) 60 mg 24 hr tablet TAKE 1 TABLET DAILY 90 tablet 3    prednisoLONE acetate (PRED FORTE) 1 % ophthalmic suspension Administer 1 drop to the right eye every other day      rosuvastatin (CRESTOR) 10 MG tablet Take 10 mg by mouth daily      sertraline (ZOLOFT) 100 mg tablet Take 100 mg by mouth daily        Synthroid 200 MCG tablet Take 1 tablet (200 mcg total) by mouth daily 90 tablet 3     No current facility-administered medications for this visit. No Known Allergies    Objective   Vitals: Blood pressure 122/64, pulse 55, height 5' 5" (1.651 m), weight 115 kg (253 lb 3.2 oz), SpO2 97 %. Invasive Devices       None                   Physical Exam  Vitals reviewed. Constitutional:       General: She is not in acute distress. Appearance: She is well-developed. She is not diaphoretic. HENT:      Head: Normocephalic and atraumatic. Eyes:      Conjunctiva/sclera: Conjunctivae normal.      Pupils: Pupils are equal, round, and reactive to light. Neck:      Thyroid: No thyromegaly. Cardiovascular:      Rate and Rhythm: Normal rate and regular rhythm. Pulmonary:      Effort: Pulmonary effort is normal. No respiratory distress. Breath sounds: Normal breath sounds. Abdominal:      General: Bowel sounds are normal.      Palpations: Abdomen is soft. Musculoskeletal:         General: Normal range of motion. Cervical back: Normal range of motion and neck supple. Skin:     General: Skin is warm and dry. Findings: No rash. Neurological:      Mental Status: She is alert and oriented to person, place, and time. Motor: No abnormal muscle tone. Psychiatric:         Behavior: Behavior normal.         The history was obtained from the review of the chart and from the patient. Lab Results:      Recent Results (from the past 33180 hour(s))   TSH, 3rd generation    Collection Time: 08/16/23  9:32 AM   Result Value Ref Range    TSH 0.18 (L) 0.40 - 4.50 mIU/L   T4, free    Collection Time: 08/16/23  9:32 AM   Result Value Ref Range    Free t4 1.2 0.8 - 1.8 ng/dL         No future appointments. Portions of the record may have been created with voice recognition software. Occasional wrong word or "sound a like" substitutions may have occurred due to the inherent limitations of voice recognition software.  Read the chart carefully and recognize, using context, where substitutions have occurred.

## 2023-11-29 LAB
T4 FREE SERPL-MCNC: 1.1 NG/DL (ref 0.8–1.8)
TSH SERPL-ACNC: 0.37 MIU/L (ref 0.4–4.5)

## 2023-11-30 DIAGNOSIS — E03.9 HYPOTHYROIDISM, UNSPECIFIED TYPE: ICD-10-CM

## 2023-11-30 RX ORDER — LEVOTHYROXINE SODIUM 200 MCG
TABLET ORAL
Qty: 90 TABLET | Refills: 3 | Status: SHIPPED | OUTPATIENT
Start: 2023-11-30

## 2023-12-04 ENCOUNTER — TELEPHONE (OUTPATIENT)
Dept: ENDOCRINOLOGY | Facility: CLINIC | Age: 78
End: 2023-12-04

## 2023-12-04 NOTE — TELEPHONE ENCOUNTER
----- Message from Ignacio Zhang DO sent at 11/30/2023  7:23 AM EST -----  Please call patient regarding these results: Reviewed recent repeat thyroid levels and TSH does remain low indicating thyroid levels are high. Therefore, we should reduce her Synthroid dose to 1 tablet 6 days of the week and just a half tab on Sundays and repeat TSH and free t4 in about 2 months. Let me know any questions.

## 2024-11-07 ENCOUNTER — TELEPHONE (OUTPATIENT)
Age: 79
End: 2024-11-07

## 2024-11-07 NOTE — TELEPHONE ENCOUNTER
Son Marquez called and wanted to let us know his mother had .    In care now she had passed in 24.